# Patient Record
Sex: FEMALE | Race: WHITE | NOT HISPANIC OR LATINO | URBAN - METROPOLITAN AREA
[De-identification: names, ages, dates, MRNs, and addresses within clinical notes are randomized per-mention and may not be internally consistent; named-entity substitution may affect disease eponyms.]

---

## 2020-08-28 ENCOUNTER — TELEHEALTH PROVIDED OTHER THAN IN PATIENT'S HOME (OUTPATIENT)
Dept: URBAN - METROPOLITAN AREA TELEHEALTH 7 | Facility: TELEHEALTH | Age: 50
End: 2020-08-28
Payer: COMMERCIAL

## 2020-08-28 VITALS — WEIGHT: 150 LBS | HEIGHT: 64 IN

## 2020-08-28 DIAGNOSIS — R10.30 LOWER ABDOMINAL PAIN, UNSPECIFIED: ICD-10-CM

## 2020-08-28 DIAGNOSIS — R11.2 NAUSEA WITH VOMITING, UNSPECIFIED: ICD-10-CM

## 2020-08-28 DIAGNOSIS — R13.10 DYSPHAGIA, UNSPECIFIED: ICD-10-CM

## 2020-08-28 PROCEDURE — 99244 OFF/OP CNSLTJ NEW/EST MOD 40: CPT | Mod: 95 | Performed by: INTERNAL MEDICINE

## 2020-08-28 RX ORDER — PANTOPRAZOLE SODIUM 40 MG/1
TABLET, DELAYED RELEASE ORAL
Qty: 30 | Refills: 5 | Status: ACTIVE
Start: 2020-08-28

## 2020-08-28 NOTE — SERVICEHPINOTES
PATIENT VERIFIED BY DATE OF BIRTH AND NAME. Patient has been consented for this telecommunication visit. Patient with complaints of dysphagia starting about 4 months ago that has progressively worsened. She notes solid food has trouble going through and feels like it gets stuck in lower chest. She denies any problems with liquids. She has not had to vomit/regurgitate food bolus. She has not had to go to the ED. She usually can walk around and "push" it down. She has occasional reflux if she eats something spicy but otherwise has no heartburn/reflux. She had unremarkable modified barium swallow with speech. Esophagram revealed normal caliber esophagus, normal motility, normal LES relaxation, no significant reflux, small hiatal hernia, and non-obstructing ring in distal esophagus. She notes nausea daily, especially in the morning. She has had instances of vomiting. She also notes lower abdominal pain which she thinks might be related to gas. She states that it often gets better or goes away after a BM. She states that discomfort feels like she is constipated but she is not. She denies any diarrhea or constipation. She denies any melena or rectal bleeding. She has noted decreased appetite at times, often losing her appetite after a choking incident. She denies any weight loss. She started on Nexium and noted improvement in nausea but not in dysphagia. She tried generic [employer] brand but that did not help anything.